# Patient Record
Sex: MALE | Race: WHITE
[De-identification: names, ages, dates, MRNs, and addresses within clinical notes are randomized per-mention and may not be internally consistent; named-entity substitution may affect disease eponyms.]

---

## 2019-02-08 ENCOUNTER — HOSPITAL ENCOUNTER (EMERGENCY)
Dept: HOSPITAL 93 - ER | Age: 84
Discharge: HOME | End: 2019-02-08
Payer: COMMERCIAL

## 2019-02-08 VITALS — HEIGHT: 67 IN | WEIGHT: 170 LBS | BODY MASS INDEX: 26.68 KG/M2
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
## 2019-02-08 DIAGNOSIS — S81.812A: Primary | ICD-10-CM
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
## 2020-08-25 ENCOUNTER — TRANSCRIPTION ENCOUNTER (OUTPATIENT)
Age: 85
End: 2020-08-25

## 2021-10-06 ENCOUNTER — OUTPATIENT (OUTPATIENT)
Dept: OUTPATIENT SERVICES | Facility: HOSPITAL | Age: 86
LOS: 1 days | End: 2021-10-06
Payer: MEDICARE

## 2021-10-06 PROCEDURE — 93010 ELECTROCARDIOGRAM REPORT: CPT

## 2021-10-07 ENCOUNTER — OUTPATIENT (OUTPATIENT)
Dept: OUTPATIENT SERVICES | Facility: HOSPITAL | Age: 86
LOS: 1 days | End: 2021-10-07

## 2021-10-17 ENCOUNTER — APPOINTMENT (OUTPATIENT)
Dept: DISASTER EMERGENCY | Facility: CLINIC | Age: 86
End: 2021-10-17

## 2021-10-17 DIAGNOSIS — Z01.818 ENCOUNTER FOR OTHER PREPROCEDURAL EXAMINATION: ICD-10-CM

## 2021-10-17 PROBLEM — Z00.00 ENCOUNTER FOR PREVENTIVE HEALTH EXAMINATION: Status: ACTIVE | Noted: 2021-10-17

## 2021-10-18 LAB — SARS-COV-2 N GENE NPH QL NAA+PROBE: NOT DETECTED

## 2021-10-20 ENCOUNTER — OUTPATIENT (OUTPATIENT)
Dept: OUTPATIENT SERVICES | Facility: HOSPITAL | Age: 86
LOS: 1 days | End: 2021-10-20

## 2022-04-03 ENCOUNTER — TRANSCRIPTION ENCOUNTER (OUTPATIENT)
Age: 87
End: 2022-04-03

## 2022-04-12 ENCOUNTER — OUTPATIENT (OUTPATIENT)
Dept: OUTPATIENT SERVICES | Facility: HOSPITAL | Age: 87
LOS: 1 days | End: 2022-04-12

## 2022-04-12 DIAGNOSIS — M17.11 UNILATERAL PRIMARY OSTEOARTHRITIS, RIGHT KNEE: ICD-10-CM

## 2022-04-17 ENCOUNTER — TRANSCRIPTION ENCOUNTER (OUTPATIENT)
Age: 87
End: 2022-04-17

## 2022-04-19 ENCOUNTER — APPOINTMENT (OUTPATIENT)
Dept: DISASTER EMERGENCY | Facility: HOSPITAL | Age: 87
End: 2022-04-19

## 2022-04-19 ENCOUNTER — OUTPATIENT (OUTPATIENT)
Dept: INPATIENT UNIT | Facility: HOSPITAL | Age: 87
LOS: 1 days | End: 2022-04-19
Payer: MEDICARE

## 2022-04-19 VITALS
RESPIRATION RATE: 20 BRPM | SYSTOLIC BLOOD PRESSURE: 123 MMHG | DIASTOLIC BLOOD PRESSURE: 73 MMHG | HEIGHT: 69 IN | OXYGEN SATURATION: 94 % | WEIGHT: 149.91 LBS | TEMPERATURE: 98 F | HEART RATE: 59 BPM

## 2022-04-19 VITALS
RESPIRATION RATE: 18 BRPM | TEMPERATURE: 98 F | HEART RATE: 57 BPM | OXYGEN SATURATION: 96 % | SYSTOLIC BLOOD PRESSURE: 118 MMHG | DIASTOLIC BLOOD PRESSURE: 78 MMHG

## 2022-04-19 DIAGNOSIS — U07.1 COVID-19: ICD-10-CM

## 2022-04-19 PROCEDURE — M0222: CPT

## 2022-04-19 RX ORDER — BEBTELOVIMAB 87.5 MG/ML
175 INJECTION, SOLUTION INTRAVENOUS ONCE
Refills: 0 | Status: COMPLETED | OUTPATIENT
Start: 2022-04-19 | End: 2022-04-19

## 2022-04-19 RX ADMIN — BEBTELOVIMAB 175 MILLIGRAM(S): 87.5 INJECTION, SOLUTION INTRAVENOUS at 13:50

## 2022-04-19 NOTE — CHART NOTE - NSCHARTNOTEFT_GEN_A_CORE
CC: Monoclonal Antibody Infusion/COVID 19 Positive on 4/17/22  88y Male  with recent dx of COVID 19+ who presents today for elective Bebtelovimab. Patient has been screened and was deemed to be a candidate.    Symptoms/ Criteria  Date of Symptom Onset: 4/17/22  Symptoms: cough congestion malaise, body aches  Date of Positive COVID PCR: 4/17/22  Risk Profile includes:   Age >65, COPD, BMI >25    Vaccination Status: Vaccinated x3 with Pfizerlast was 9/2021    PMHx:  Infection due to severe acute respiratory syndrome coronavirus 2 (SARS-CoV-2)    Handoff    MEWS Score        Exam/findings:  T(C): 36.5 (04-19-22 @ 13:17), Max: 36.5 (04-19-22 @ 13:17)  HR: 59 (04-19-22 @ 13:17) (59 - 59)  BP: 123/73 (04-19-22 @ 13:17) (123/73 - 123/73)  RR: 20 (04-19-22 @ 13:17) (20 - 20)  SpO2: 94% (04-19-22 @ 13:17) (94% - 94%)    PE:   Appearance: NAD	  HEENT:  NC/AT  Cardiovascular:  No edema  Respiratory: no use of accessory muscles  Gastrointestinal:  non-distended   Skin: warm and dry  Neurologic: Non-focal  Extremities: Normal range of motion    ASSESSMENT:  Pt is COVID positive with mild to moderate symptoms who was referred for elective MAB (Bebtelovimab).    PLAN:  - MAB treatment explained to patient. I have reviewed the Bebtelovimab Emergency Use Authorization (EUA) and I have provided the patient or patient's caregiver with the following information:   1. FDA has authorized emergency use of Bebtelovimab to be administered for the treatment of mild to moderate COVID-19, which is not an FDA-approved biological product.   2. The patient or patient's caregiver has the option to accept or refuse administration of MAB.   3. The significant known and potential risks and benefits of Bebtelovimab and the extent to which such risks and benefits are unknown.  4. Information on available alternative treatments and risks and benefits of those alternatives.  - Patient verbalized understanding of plan and agrees to treatment. All questions answered.  - Consent for MAB obtained.   - 175mg of Bebtelovimab administered as a single intravenous injection over at least 30 seconds.   - Observe patient for one hour post medication administration and then if stable, discharge home with oupatient follow up as planned by PCP.      POST ASSESSMENT:   Patient completed MAB, and monitored x 1 hour post-infusion with no adverse reactions noted, remained hemodynamically stable.  - Patient tolerated infusion well; denies complaints of chest pain/SOB/dizziness/palpitations.   - VSS for discharge home.  - D/C instructions given/ fact sheet included.  - Patient was instructed to self-isolate and use infection control measures (e.g wear mask, isolate, social distance, avoid sharing personal items, clean and disinfect "high touch" surfaces, and frequent handwashing according to the CDC guidelines.   - The patient was informed on what symptoms to be aware of for the next couple of days, and if there are any issues to call the 24/7 clinical call center. Patient was instructed to follow up with primary care provider as needed.  -Discharge 1 hour post-injection

## 2022-07-12 ENCOUNTER — EMERGENCY (EMERGENCY)
Facility: HOSPITAL | Age: 87
LOS: 1 days | Discharge: HOME | End: 2022-07-12
Admitting: EMERGENCY MEDICINE

## 2022-07-12 DIAGNOSIS — I71.4 ABDOMINAL AORTIC ANEURYSM, WITHOUT RUPTURE: ICD-10-CM

## 2022-07-12 DIAGNOSIS — Z04.3 ENCOUNTER FOR EXAMINATION AND OBSERVATION FOLLOWING OTHER ACCIDENT: ICD-10-CM

## 2022-07-12 DIAGNOSIS — S70.02XA CONTUSION OF LEFT HIP, INITIAL ENCOUNTER: ICD-10-CM

## 2022-07-12 DIAGNOSIS — M54.50 LOW BACK PAIN, UNSPECIFIED: ICD-10-CM
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
## 2022-07-12 DIAGNOSIS — R91.1 SOLITARY PULMONARY NODULE: ICD-10-CM

## 2022-07-12 DIAGNOSIS — R74.8 ABNORMAL LEVELS OF OTHER SERUM ENZYMES: ICD-10-CM
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
## 2022-07-12 DIAGNOSIS — K86.89 OTHER SPECIFIED DISEASES OF PANCREAS: ICD-10-CM
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
## 2022-07-12 PROCEDURE — 72125 CT NECK SPINE W/O DYE: CPT | Mod: 26,MA

## 2022-07-12 PROCEDURE — 70450 CT HEAD/BRAIN W/O DYE: CPT | Mod: 26,MA

## 2022-07-12 PROCEDURE — 71260 CT THORAX DX C+: CPT | Mod: 26,MA

## 2022-07-12 PROCEDURE — 73502 X-RAY EXAM HIP UNI 2-3 VIEWS: CPT | Mod: 26,LT

## 2022-07-12 PROCEDURE — 74177 CT ABD & PELVIS W/CONTRAST: CPT | Mod: 26,MA

## 2022-07-12 PROCEDURE — 99284 EMERGENCY DEPT VISIT MOD MDM: CPT

## 2022-07-29 ENCOUNTER — EMERGENCY (EMERGENCY)
Facility: HOSPITAL | Age: 87
LOS: 1 days | Discharge: HOME | End: 2022-07-29
Admitting: EMERGENCY MEDICINE

## 2022-07-29 DIAGNOSIS — I46.9 CARDIAC ARREST, CAUSE UNSPECIFIED: ICD-10-CM

## 2022-07-29 PROCEDURE — 71045 X-RAY EXAM CHEST 1 VIEW: CPT | Mod: 26

## 2022-07-29 PROCEDURE — 99285 EMERGENCY DEPT VISIT HI MDM: CPT | Mod: 25

## 2022-07-29 PROCEDURE — 36556 INSERT NON-TUNNEL CV CATH: CPT

## 2022-07-29 PROCEDURE — 92950 HEART/LUNG RESUSCITATION CPR: CPT

## 2022-07-29 PROCEDURE — 93010 ELECTROCARDIOGRAM REPORT: CPT
